# Patient Record
Sex: FEMALE | Race: BLACK OR AFRICAN AMERICAN | Employment: FULL TIME | URBAN - METROPOLITAN AREA
[De-identification: names, ages, dates, MRNs, and addresses within clinical notes are randomized per-mention and may not be internally consistent; named-entity substitution may affect disease eponyms.]

---

## 2019-02-13 ENCOUNTER — HOSPITAL ENCOUNTER (EMERGENCY)
Age: 19
Discharge: HOME | End: 2019-02-13
Attending: EMERGENCY MEDICINE
Payer: COMMERCIAL

## 2019-02-13 VITALS
BODY MASS INDEX: 34.84 KG/M2 | HEART RATE: 100 BPM | SYSTOLIC BLOOD PRESSURE: 150 MMHG | OXYGEN SATURATION: 98 % | HEIGHT: 67 IN | DIASTOLIC BLOOD PRESSURE: 65 MMHG | RESPIRATION RATE: 16 BRPM | TEMPERATURE: 99 F | WEIGHT: 222 LBS

## 2019-02-13 DIAGNOSIS — N94.89 LABIAL SWELLING: Primary | ICD-10-CM

## 2019-02-13 DIAGNOSIS — Z20.2 POSSIBLE EXPOSURE TO STD: ICD-10-CM

## 2019-02-13 DIAGNOSIS — Z3A.20 20 WEEKS GESTATION OF PREGNANCY: ICD-10-CM

## 2019-02-13 LAB
APPEARANCE UR: ABNORMAL
BACTERIA URNS QL MICRO: ABNORMAL /HPF
BILIRUB UR QL: NEGATIVE
CLUE CELLS VAG QL WET PREP: NORMAL
COLOR UR: ABNORMAL
EPITH CASTS URNS QL MICRO: ABNORMAL /LPF
GLUCOSE UR STRIP.AUTO-MCNC: NEGATIVE MG/DL
HGB UR QL STRIP: NEGATIVE
KETONES UR QL STRIP.AUTO: 15 MG/DL
KOH PREP SPEC: NORMAL
LEUKOCYTE ESTERASE UR QL STRIP.AUTO: ABNORMAL
NITRITE UR QL STRIP.AUTO: NEGATIVE
PH UR STRIP: 6.5 [PH] (ref 5–8)
PROT UR STRIP-MCNC: NEGATIVE MG/DL
RBC #/AREA URNS HPF: ABNORMAL /HPF (ref 0–5)
SERVICE CMNT-IMP: NORMAL
SP GR UR REFRACTOMETRY: 1.01 (ref 1–1.03)
T VAGINALIS VAG QL WET PREP: NORMAL
UA: UC IF INDICATED,UAUC: ABNORMAL
UROBILINOGEN UR QL STRIP.AUTO: 1 EU/DL (ref 0.2–1)
WBC URNS QL MICRO: ABNORMAL /HPF (ref 0–4)

## 2019-02-13 PROCEDURE — 87086 URINE CULTURE/COLONY COUNT: CPT

## 2019-02-13 PROCEDURE — 87491 CHLMYD TRACH DNA AMP PROBE: CPT

## 2019-02-13 PROCEDURE — 87147 CULTURE TYPE IMMUNOLOGIC: CPT

## 2019-02-13 PROCEDURE — 99284 EMERGENCY DEPT VISIT MOD MDM: CPT

## 2019-02-13 PROCEDURE — 87210 SMEAR WET MOUNT SALINE/INK: CPT

## 2019-02-13 PROCEDURE — 81001 URINALYSIS AUTO W/SCOPE: CPT

## 2019-02-13 RX ORDER — CLINDAMYCIN HYDROCHLORIDE 300 MG/1
300 CAPSULE ORAL 3 TIMES DAILY
Qty: 30 CAP | Refills: 0 | Status: SHIPPED | OUTPATIENT
Start: 2019-02-13 | End: 2019-02-23

## 2019-02-13 NOTE — ED PROVIDER NOTES
EMERGENCY DEPARTMENT HISTORY AND PHYSICAL EXAM 
 
 
Date: 2/13/2019 Patient Name: Darrell Garcia History of Presenting Illness Chief Complaint Patient presents with  Vaginal Swelling  
  pt reports vaginal swelling after having sex last night, feels pressure, denies pain History Provided By: Patient HPI: Darrell Garcia, 22 y.o. female presents ambulatory to the ED with concern for STD. Pt is 20 weeks pregnant, followed by [facility]. She states she had intercourse with her boyfriend last evening and this am noted swelling to her vagina. She denied any pelvic pain, cramping or bleeding. She states she has been seeing her Ob/Gyn regularly and had full evaluation including STD screening previously \"but my boyfriend cheated on me recently\". Pt denied fever/chills. No dysuria. No constipation/straining. She denied vaginal discharge. Chief Complaint: labial swelling Duration: 1 Days Timing:  Acute Location: L labia majora Quality: Pressure Severity: Moderate Modifying Factors: pt is currently 5 months pregnant Associated Symptoms: denies any other associated signs or symptoms There are no other complaints, changes, or physical findings at this time. PCP: None Current Outpatient Medications Medication Sig Dispense Refill  clindamycin (CLEOCIN) 300 mg capsule Take 1 Cap by mouth three (3) times daily for 10 days. 30 Cap 0 Past History Past Medical History: 
Past Medical History:  
Diagnosis Date  Psychiatric disorder ADHD Past Surgical History: 
History reviewed. No pertinent surgical history. Family History: 
History reviewed. No pertinent family history. Social History: 
Social History Tobacco Use  Smoking status: Not on file Substance Use Topics  Alcohol use: Not on file  Drug use: Not on file Allergies: 
No Known Allergies Review of Systems Review of Systems Constitutional: Negative for chills and fever. HENT: Negative for congestion, rhinorrhea and sore throat. Respiratory: Negative for cough and shortness of breath. Cardiovascular: Negative for chest pain and palpitations. Gastrointestinal: Negative for abdominal pain, diarrhea, nausea and vomiting. Endocrine: Negative for polydipsia, polyphagia and polyuria. Genitourinary: Positive for genital sores and vaginal pain. Negative for decreased urine volume, dysuria, hematuria, vaginal bleeding and vaginal discharge. Musculoskeletal: Negative for neck pain and neck stiffness. Skin: Positive for wound. Negative for rash. See  Allergic/Immunologic: Positive for immunocompromised state. Negative for environmental allergies and food allergies. Neurological: Negative for dizziness and headaches. Psychiatric/Behavioral: Negative for agitation and confusion. Physical Exam  
Physical Exam  
Constitutional: She is oriented to person, place, and time. She appears well-developed and well-nourished. No distress. WDWN AA female, alert, in NAD HENT:  
Head: Normocephalic and atraumatic. Nose: Nose normal.  
Eyes: Conjunctivae and EOM are normal. Right eye exhibits no discharge. Left eye exhibits no discharge. No scleral icterus. Neck: Normal range of motion. Neck supple. Cardiovascular: Normal rate, regular rhythm and normal heart sounds. Pulmonary/Chest: Effort normal and breath sounds normal. No respiratory distress. She has no wheezes. Abdominal: Soft. She exhibits no distension. There is no tenderness. There is no rebound and no guarding. abd soft, gravid uterus, NT, no CVAT Genitourinary: No vaginal discharge found. Musculoskeletal: Normal range of motion. She exhibits no edema. Neurological: She is alert and oriented to person, place, and time. She exhibits normal muscle tone. Coordination normal.  
Skin: Skin is warm and dry. She is not diaphoretic. No erythema.   
See PELVIC exam  
 Psychiatric: She has a normal mood and affect. Her behavior is normal. Judgment normal.  
Nursing note and vitals reviewed. Pelvic Exam 
Date/Time: 2/13/2019 1:20 PM 
Performed by: PA 
Procedure duration:  15 minutes. Documented by:  Ailin Quach PA-C. Exam assisted by:  Edward Oliva RN. Type of exam performed: speculum. External genitalia appearance: swelling (fullness palpated at L labia majora, no focal fluid collection, no erythema). Vaginal exam:  normal.   
Cervical exam:  normal and os closed. Specimen(s) collected:  chlamydia and GC. Patient tolerance: Patient tolerated the procedure well with no immediate complications Diagnostic Study Results Labs - Recent Results (from the past 12 hour(s)) LB, OTHER SOURCES Collection Time: 02/13/19  1:31 PM  
Result Value Ref Range Special Requests: NO SPECIAL REQUESTS    
 KOH NO YEAST SEEN    
WET PREP Collection Time: 02/13/19  1:31 PM  
Result Value Ref Range Clue cells CLUE CELLS PRESENT Wet prep NO TRICHOMONAS SEEN    
URINALYSIS W/ REFLEX CULTURE Collection Time: 02/13/19  1:31 PM  
Result Value Ref Range Color YELLOW/STRAW Appearance CLOUDY (A) CLEAR Specific gravity 1.015 1.003 - 1.030    
 pH (UA) 6.5 5.0 - 8.0 Protein NEGATIVE  NEG mg/dL Glucose NEGATIVE  NEG mg/dL Ketone 15 (A) NEG mg/dL Bilirubin NEGATIVE  NEG Blood NEGATIVE  NEG Urobilinogen 1.0 0.2 - 1.0 EU/dL Nitrites NEGATIVE  NEG Leukocyte Esterase LARGE (A) NEG    
 WBC 20-50 0 - 4 /hpf  
 RBC 0-5 0 - 5 /hpf Epithelial cells MODERATE (A) FEW /lpf Bacteria 2+ (A) NEG /hpf  
 UA:UC IF INDICATED URINE CULTURE ORDERED (A) CNI Radiologic Studies - No orders to display Medical Decision Making I am the first provider for this patient. I reviewed the vital signs, available nursing notes, past medical history, past surgical history, family history and social history. Vital Signs-Reviewed the patient's vital signs. Patient Vitals for the past 12 hrs: 
 Temp Pulse Resp BP SpO2  
02/13/19 1229 99 °F (37.2 °C) 100 16 150/65 98 % Records Reviewed: Nursing Notes, Old Medical Records, Previous Radiology Studies and Previous Laboratory Studies Provider Notes (Medical Decision Making): Abscess, Bartholin cyst, STD, UTI 
 
ED Course:  
Initial assessment performed. The patients presenting problems have been discussed, and they are in agreement with the care plan formulated and outlined with them. I have encouraged them to ask questions as they arise throughout their visit. Case d/w Dr. Mckeon Scales. Pt without pelvic pain, bleeding. Concerned for labial lesion/STD. He advised no bedside U/S to assess FHT required. DISCHARGE NOTE: 
The care plan has been outline with the patient and/or family, who verbally conveyed understanding and agreement. Available results have been reviewed. Patient and/or family understand the follow up plan as outlined and discharge instructions. Should their condition deterioration at any time after discharge the patient agrees to return, follow up sooner than outlined or seek medical assistance at the closest Emergency Room as soon as possible. Questions have been answered. Discharge instructions and educational information regarding the patient's diagnosis as well a list of reasons why the patient would want to seek immediate medical attention, should their condition change, were reviewed directly with the patient/family PLAN: 
1. Discharge Medication List as of 2/13/2019  1:41 PM  
  
START taking these medications Details  
clindamycin (CLEOCIN) 300 mg capsule Take 1 Cap by mouth three (3) times daily for 10 days. , Print, Disp-30 Cap, R-0  
  
  
 
2. Follow-up Information Follow up With Specialties Details Why Contact Info U DEPARTMENT OF OB/GYN    [address] 
[place] [address] [phone] [facility] EMERGENCY DEPT Emergency Medicine  If symptoms worsen [address] [facility] 
[phone] Return to ED if worse Diagnosis Clinical Impression: 1. Labial swelling 2. 20 weeks gestation of pregnancy 3.  Possible exposure to STD

## 2019-02-13 NOTE — LETTER
[address] 
[facility] EMERGENCY DEPT 
[address] [place] [address] [address] [address] 
[phone] Work/School Note Date: 2/13/2019 To Whom It May concern: 
 
Darrell Garcia was seen and treated today in the emergency room. She may return to work in 1 to 2 days, as symptoms improve. Sincerely, Moses Waters, [address]

## 2019-02-13 NOTE — DISCHARGE INSTRUCTIONS
Room temperature Sitz Baths two to three times daily. Follow up with your Ob/Gyn for recheck in 1 to 2 days. Return to the Emergency Dept for any worsening pain, swelling, redness. Patient Education        Bartholin Gland Cyst: Care Instructions  Your Care Instructions    The Bartholin glands are in a woman's vulva. This is the area around the vagina. The glands are normally about the size of a pea. They provide fluid to the vulvar area through a small opening. If the opening is blocked, the gland swells with fluid and forms a cyst. You can have a cyst for years with no symptoms. But if a cyst gets infected by bacteria, it can grow and become red and painful. This is called an abscess. Opening and draining the cyst usually cures the infection. You may have had a small tube (catheter) placed into the cyst or had minor surgery to let the cyst drain. The tube will usually be left in for at least 4 weeks. Your doctor may do a lab test to find out what kind of bacteria caused the infection. You may get antibiotics to kill the bacteria. You may have some drainage from the cyst for a few weeks. The gland should return to normal after the infection clears up. Follow-up care is a key part of your treatment and safety. Be sure to make and go to all appointments, and call your doctor if you are having problems. It's also a good idea to know your test results and keep a list of the medicines you take. How can you care for yourself at home? · If your doctor prescribed antibiotics, take them as directed. Do not stop taking them just because you feel better. You need to take the full course of antibiotics. · Sit in a few inches of warm water (sitz bath) 3 times a day and after bowel movements. The warm water helps the area heal and eases discomfort. · Take an over-the-counter pain medicine such as acetaminophen (Tylenol), ibuprofen (Advil, Motrin), or naproxen (Aleve).  Read and follow all instructions on the label.  · Do not take two or more pain medicines at the same time unless the doctor told you to. Many pain medicines have acetaminophen, which is Tylenol. Too much acetaminophen (Tylenol) can be harmful. · Wear panty liners or pads if you have discharge from the draining cyst.  · If you are sexually active, avoid sex until:  ? You have finished the antibiotics. ? The area has healed. · If you had a catheter placed in the cyst to help it drain, follow your doctor's instructions for activities until the tube comes out. When should you call for help? Call your doctor now or seek immediate medical care if:    · You have symptoms of a new or worse infection, such as:  ? Increased pain, swelling, warmth, or redness. ? Red streaks leading from the area. ? Pus draining from the area. ? A fever.    Watch closely for changes in your health, and be sure to contact your doctor if:    · The catheter falls out.     · You are not getting better as expected. Where can you learn more? Go to http://yasmine-derek.info/. Enter H276 in the search box to learn more about \"Bartholin Gland Cyst: Care Instructions. \"  Current as of: May 14, 2018  Content Version: 11.9  © 8210-3388 [employer]. Care instructions adapted under license by [employer] (which disclaims liability or warranty for this information). If you have questions about a medical condition or this instruction, always ask your healthcare professional. [place] [address] any warranty or liability for your use of this information. Patient Education        Weeks 18 to 22 of Your Pregnancy: Care Instructions  Your Care Instructions    Your baby is continuing to develop quickly. At this stage, babies can now suck their thumbs,  firmly with their hands, and open and close their eyelids. Sometime between 18 and 22 weeks, you will start to feel your baby move.  At first, these small fetal movements feel like fluttering or \"butterflies. \" Some women say that they feel like gas bubbles. As the baby grows, these movements will become stronger. You may also notice that your baby kicks and hiccups. During this time, you may find that your nausea and fatigue are gone. Overall, you may feel better and have more energy than you did in your first trimester. But you may also have new discomforts now, such as sleep problems or leg cramps. This care sheet can help you ease these discomforts. Follow-up care is a key part of your treatment and safety. Be sure to make and go to all appointments, and call your doctor if you are having problems. It's also a good idea to know your test results and keep a list of the medicines you take. How can you care for yourself at home? Ease sleep problems  · Avoid caffeine in drinks or chocolate late in the day. · Get some exercise every day. · Take a warm shower or bath before bed. · Have a light snack or glass of milk at bedtime. · Do relaxation exercises in bed to calm your mind and body. · Support your legs and back with extra pillows. Try a pillow between your legs if you sleep on your side. · Do not use sleeping pills or alcohol. They could harm your baby. Ease leg cramps  · Do not massage your calf during the cramp. · Sit on a firm bed or chair. Straighten your leg, and bend your foot (flex your ankle) slowly upward, toward your knee. Bend your toes up and down. · Stand on a cool, flat surface. Stretch your toes upward, and take small steps walking on your heels. · Use a heating pad or hot water bottle to help with muscle ache. Prevent leg cramps  · Be sure to get enough calcium. If you are worried that you are not getting enough, talk to your doctor. · Exercise every day, and stretch your legs before bed. · Take a warm bath before bed, and try leg warmers at night. Where can you learn more? Go to http://yasmine-derek.info/.   Enter F656 in the search box to learn more about \"Weeks 18 to 22 of Your Pregnancy: Care Instructions. \"  Current as of: September 5, 2018  Content Version: 11.9  © 1729-5362 [employer]. Care instructions adapted under license by [employer] (which disclaims liability or warranty for this information). If you have questions about a medical condition or this instruction, always ask your healthcare professional. [place] [address] any warranty or liability for your use of this information.

## 2019-02-15 LAB
C TRACH DNA SPEC QL NAA+PROBE: NEGATIVE
N GONORRHOEA DNA SPEC QL NAA+PROBE: NEGATIVE
SAMPLE TYPE: NORMAL
SERVICE CMNT-IMP: NORMAL
SPECIMEN SOURCE: NORMAL

## 2019-02-16 LAB
BACTERIA SPEC CULT: ABNORMAL
BACTERIA SPEC CULT: ABNORMAL
CC UR VC: ABNORMAL
SERVICE CMNT-IMP: ABNORMAL

## 2023-01-24 ENCOUNTER — HOSPITAL ENCOUNTER (EMERGENCY)
Age: 23
Discharge: HOME | End: 2023-01-24
Attending: EMERGENCY MEDICINE
Payer: COMMERCIAL

## 2023-01-24 VITALS
DIASTOLIC BLOOD PRESSURE: 86 MMHG | TEMPERATURE: 97.5 F | SYSTOLIC BLOOD PRESSURE: 148 MMHG | RESPIRATION RATE: 20 BRPM | OXYGEN SATURATION: 97 % | HEART RATE: 95 BPM | WEIGHT: 267.42 LBS | BODY MASS INDEX: 41.97 KG/M2 | HEIGHT: 67 IN

## 2023-01-24 DIAGNOSIS — N76.4 ABSCESS OF GENITAL LABIA: Primary | ICD-10-CM

## 2023-01-24 PROCEDURE — 99283 EMERGENCY DEPT VISIT LOW MDM: CPT

## 2023-01-24 RX ORDER — TRAMADOL HYDROCHLORIDE 50 MG/1
50 TABLET ORAL
Qty: 20 TABLET | Refills: 0 | Status: SHIPPED | OUTPATIENT
Start: 2023-01-24 | End: 2023-01-27

## 2023-01-24 RX ORDER — SULFAMETHOXAZOLE AND TRIMETHOPRIM 800; 160 MG/1; MG/1
1 TABLET ORAL 2 TIMES DAILY
Qty: 20 TABLET | Refills: 0 | Status: SHIPPED | OUTPATIENT
Start: 2023-01-24 | End: 2023-02-03

## 2023-01-24 NOTE — ED NOTES
Provider reviewed discharge instructions with the patient. The patient verbalized understanding. All questions and concerns were addressed. The patient is discharged ambulatory with instructions and prescriptions in hand. Pt is alert and oriented x 4. Respirations are clear and unlabored.

## 2023-01-24 NOTE — DISCHARGE INSTRUCTIONS
It was a pleasure taking care of you at [facility] Emergency Department today. We know that when you come to [facility], you are entrusting us with your health, comfort, and safety. Our physicians and nurses honor that trust, and we truly appreciate the opportunity to care for you and your loved ones. We also value your feedback. If you receive a survey about your Emergency Department experience today, please fill it out. We care about our patients' feedback, and we listen to what you have to say. Thank you!

## 2023-01-24 NOTE — Clinical Note
[address]  [facility] EMERGENCY DEPT  [address] [address]  [phone]    Work/School Note    Date: 1/24/2023    To Whom It May concern:    Jorge Fabian was seen and treated today in the emergency room by the following provider(s):  Attending Provider: Glenroy Shaw MD  Physician Assistant: Alexis Gentile, [address]. Jorge Fabian is excused from work/school on 01/24/23 and 01/25/23. She is medically clear to return to work/school on 1/26/2023.        Sincerely,          [facility], [address]

## 2023-01-24 NOTE — ED PROVIDER NOTES
[facility] EMERGENCY DEPT  EMERGENCY DEPARTMENT ENCOUNTER       Pt Name: Shannan De Luna  MRN: [id number]  [place] 2000  Date of evaluation: 1/24/2023  Provider: TRENT Foley   PCP: None  Note Started: 7:20 AM 1/24/23     CHIEF COMPLAINT       Chief Complaint   Patient presents with    Cyst     Patient has a cyst near labia, was waxed 2 weeks ago so does not think it is a hair follicle. Cysts for the past two days. Said they have pain when sitting described as 10 out of 10. HISTORY OF PRESENT ILLNESS: 1 or more elements      History From: Patient  HPI Limitations : None     Shannan De Luna is a 20 y.o. female who presents by POV with complaints of swelling and pain to her left labia. This started about 2 weeks ago. [facility] after the onset and diagnosed with a urinary tract infection. She was placed on antibiotic, which she thinks is Macrobid and this has not helped with the symptoms. She also reports that she was waxed 2 weeks ago and is unsure if this is the cause. The pain is severe and increases with walking. 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 She is requesting a work note. Nursing Notes were all reviewed and agreed with or any disagreements were addressed in the HPI. REVIEW OF SYSTEMS      Review of Systems   Constitutional:  Negative for chills, diaphoresis and fever. HENT:  Negative for congestion, ear pain, rhinorrhea and sore throat. Respiratory:  Negative for cough and shortness of breath. Cardiovascular:  Negative for chest pain. Gastrointestinal:  Negative for abdominal pain, constipation, diarrhea, nausea and vomiting. Genitourinary:  Positive for genital sores. Negative for difficulty urinating, dysuria, frequency, hematuria, vaginal bleeding, vaginal discharge and vaginal pain. Musculoskeletal:  Negative for arthralgias and myalgias. Neurological:  Negative for headaches.    All other systems reviewed and are negative. Positives and Pertinent negatives as per HPI. PAST HISTORY     Past Medical History:  Past Medical History:   Diagnosis Date    Psychiatric disorder     ADHD       Past Surgical History:  No past surgical history on file. Family History:  No family history on file. Social History: Allergies:  No Known Allergies    CURRENT MEDICATIONS      Previous Medications    No medications on file       PHYSICAL EXAM      ED Triage Vitals [01/24/23 0639]   ED Encounter Vitals Group      BP (!) 155/95      Pulse (Heart Rate) (!) 125      Resp Rate 18      Temp 97.5 °F (36.4 °C)      Temp src       O2 Sat (%) 98 %      Weight 267 lb 6.7 oz      Height 5' 7\"        Physical Exam  Vitals and nursing note reviewed. Constitutional:       General: She is not in acute distress. Appearance: She is well-developed. She is obese. She is not diaphoretic. Comments: 20 y.o. -American female    HENT:      Head: Normocephalic and atraumatic. Eyes:      General:         Right eye: No discharge. Left eye: No discharge. Conjunctiva/sclera: Conjunctivae normal.   Cardiovascular:      Rate and Rhythm: Normal rate. Pulmonary:      Effort: Pulmonary effort is normal.      Breath sounds: Normal breath sounds. Genitourinary:     Comments: Abscess to the left labia between the vagina and rectum. No surrounding cellulitis. No drainage. Musculoskeletal:      Cervical back: Normal range of motion and neck supple. Skin:     General: Skin is warm and dry. Neurological:      Mental Status: She is alert and oriented to person, place, and time. Psychiatric:         Behavior: Behavior normal.        DIAGNOSTIC RESULTS   LABS:     No results found for this or any previous visit (from the past 12 hour(s)). EKG:  When ordered, EKG's are interpreted by the Emergency Department Physician in the absence of a cardiologist.  Please see their note for interpretation of EKG. RADIOLOGY:  Non-plain film images such as CT, Ultrasound and MRI are read by the radiologist. Plain radiographic images are visualized and preliminarily interpreted by the ED Provider with the below findings:     Not applicable     Interpretation per the Radiologist below, if available at the time of this note:     No results found. PROCEDURES   Unless otherwise noted below, none  Procedures     CRITICAL CARE TIME   None    EMERGENCY DEPARTMENT COURSE and DIFFERENTIAL DIAGNOSIS/MDM   Vitals:    Vitals:    01/24/23 0639 01/24/23 0719   BP: (!) 155/95 (!) 148/86   Pulse: (!) 125 95   Resp: 18 20   Temp: 97.5 °F (36.4 °C)    SpO2: 98% 97%   Weight: 121.3 kg (267 lb 6.7 oz)    Height: 5' 7\" (1.702 m)         Patient was given the following medications:  Medications - No data to display    CONSULTS: (Who and What was discussed)  None    Chronic Conditions: None    Social Determinants affecting Dx or Tx: None    Records Reviewed (source and summary of external records): Nursing Notes    CC/HPI Summary, DDx, ED Course, and Reassessment: Patient presents ED tachycardic with complaints of pain and swelling to her left labia. Her tachycardia resolved while in the ED without intervention. Exam shows a left labial abscess without cellulitis. Recommended I&D, which patient declined. Instructed patient to apply warm compresses or warm soaks. Placed on antibiotics and pain medication and instructed to follow-up with GYN for further evaluation. ED for deterioration. Disposition Considerations (Tests not done, Shared Decision Making, Pt Expectation of Test or Tx.): Considered I&D. Discussed risks and benefits with the patient. She declined this procedure. FINAL IMPRESSION     1. Abscess of genital labia          DISPOSITION/PLAN   Discharged    Discharge Note: The patient is stable for discharge home.  The signs, symptoms, diagnosis, and discharge instructions have been discussed, understanding conveyed, and agreed upon. The patient is to follow up as recommended or return to ER should their symptoms worsen. PATIENT REFERRED TO:  Follow-up Information       Follow up With Specialties Details Why Contact Info    Your GYN  In 3 days      [facility] EMERGENCY DEPT Emergency Medicine  If symptoms worsen [address] [place] [address]  [phone]              DISCHARGE MEDICATIONS:  Current Discharge Medication List        START taking these medications    Details   trimethoprim-sulfamethoxazole (Bactrim DS) 160-800 mg per tablet Take 1 Tablet by mouth two (2) times a day for 10 days. Qty: 20 Tablet, Refills: 0  Start date: 1/24/2023, End date: 2/3/2023      traMADoL (Ultram) 50 mg tablet Take 1 Tablet by mouth every six (6) hours as needed for Pain for up to 3 days. Max Daily Amount: 200 mg. Qty: 20 Tablet, Refills: 0  Start date: 1/24/2023, End date: 1/27/2023    Associated Diagnoses: Abscess of genital labia               DISCONTINUED MEDICATIONS:  Current Discharge Medication List          Shared Not Shared SEJAL: I have seen and evaluated the patient. My supervision physician was available for consultation. I am the Primary Clinician of Record. TRENT Maldonado (electronically signed)    (Please note that parts of this dictation were completed with voice recognition software. Quite often unanticipated grammatical, syntax, homophones, and other interpretive errors are inadvertently transcribed by the computer software. Please disregards these errors.  Please excuse any errors that have escaped final proofreading.)